# Patient Record
Sex: MALE | Race: WHITE
[De-identification: names, ages, dates, MRNs, and addresses within clinical notes are randomized per-mention and may not be internally consistent; named-entity substitution may affect disease eponyms.]

---

## 2018-03-20 ENCOUNTER — HOSPITAL ENCOUNTER (OUTPATIENT)
Dept: HOSPITAL 74 - FER | Age: 83
Setting detail: OBSERVATION
LOS: 2 days | Discharge: HOME | End: 2018-03-22
Attending: NURSE PRACTITIONER | Admitting: INTERNAL MEDICINE
Payer: COMMERCIAL

## 2018-03-20 VITALS — BODY MASS INDEX: 20.9 KG/M2

## 2018-03-20 DIAGNOSIS — J18.1: Primary | ICD-10-CM

## 2018-03-20 DIAGNOSIS — E78.5: ICD-10-CM

## 2018-03-20 DIAGNOSIS — Z79.82: ICD-10-CM

## 2018-03-20 DIAGNOSIS — I10: ICD-10-CM

## 2018-03-20 LAB
ALBUMIN SERPL-MCNC: 3.6 G/DL (ref 3.5–5)
ALP SERPL-CCNC: 72 U/L (ref 32–92)
ALT SERPL-CCNC: 20 U/L (ref 10–40)
ANION GAP SERPL CALC-SCNC: 4 MMOL/L (ref 8–16)
AST SERPL-CCNC: 31 U/L (ref 10–42)
BASOPHILS # BLD: 2.6 % (ref 0–2)
BILIRUB SERPL-MCNC: 0.6 MG/DL (ref 0.2–1)
BUN SERPL-MCNC: 25 MG/DL (ref 7–18)
CALCIUM SERPL-MCNC: 8.5 MG/DL (ref 8.4–10.2)
CHLORIDE SERPL-SCNC: 106 MMOL/L (ref 98–107)
CO2 SERPL-SCNC: 23 MMOL/L (ref 22–28)
CREAT SERPL-MCNC: 0.9 MG/DL (ref 0.6–1.3)
DEPRECATED RDW RBC AUTO: 14.2 % (ref 11.9–15.9)
EOSINOPHIL # BLD: 1.9 % (ref 0–4.5)
GLUCOSE SERPL-MCNC: 113 MG/DL (ref 74–106)
HCT VFR BLD CALC: 37.7 % (ref 35.4–49)
HGB BLD-MCNC: 12.8 GM/DL (ref 11.7–16.9)
LYMPHOCYTES # BLD: 5.1 % (ref 8–40)
MCH RBC QN AUTO: 32.4 PG (ref 25.7–33.7)
MCHC RBC AUTO-ENTMCNC: 34 G/DL (ref 32–35.9)
MCV RBC: 95.3 FL (ref 80–96)
MONOCYTES # BLD AUTO: 5.7 % (ref 3.8–10.2)
NEUTROPHILS # BLD: 84.7 % (ref 42.8–82.8)
PLATELET # BLD AUTO: 160 K/MM3 (ref 134–434)
PMV BLD: 8.8 FL (ref 7.5–11.1)
POTASSIUM SERPLBLD-SCNC: 4.2 MMOL/L (ref 3.5–5.1)
PROT SERPL-MCNC: 6.2 G/DL (ref 6.4–8.3)
RBC # BLD AUTO: 3.95 M/MM3 (ref 4–5.6)
SODIUM SERPL-SCNC: 133 MMOL/L (ref 136–145)
WBC # BLD AUTO: 11.2 K/MM3 (ref 4–10.8)

## 2018-03-20 PROCEDURE — G0378 HOSPITAL OBSERVATION PER HR: HCPCS

## 2018-03-20 NOTE — PDOC
History of Present Illness





- General


Stated Complaint: CHILLS/COUGH/COLD


Time Seen by Provider: 03/20/18 23:25


History Source: Patient


Exam Limitations: No Limitations





- History of Present Illness


Initial Comments: 





03/20/18 23:36


This is an 87-year-old male brought in by EMS for evaluation of cough 5 days 

that is now productive and associated with some chills. Patient said that his 

cough has been productive primarily white phlegm that over the last 24 hours as 

turned yellow and green and he is developed some chills. Patient denies any 

chest pain, shortness of breath, nausea, vomiting, diarrhea. Patient has a 

history otherwise of hypertension high cholesterol but denies any history of 

diabetes. 





PAST MEDICAL HISTORY:  no significant history





PAST SURGICAL HISTORY:  no significant history





FAMILY HISTORY:  no pertinant history





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





MEDICATIONS:  reviewed





ALLERGIES:  As per nursing notes





Review of Systems


General:  No fevers or chills, no weakness, no weight loss 


HEENT: No change in vision.  No sore throat,. No ear pain


CardioVascular:  No chest pain or shortness of breath


Respiratory: Positive cough productive of yellow and green phlegm


Gastrointestinal:  no nausea, vomitting, diarrhea or constipation,  No rectal 

bleeding


Genitourinary:  No dysuria, hematuria, or frequency


Musculoskeletal:  No joint or muscle pain or swelling


Neurologic: No headache, vertigo, dizziness or loss of consciousness


Psychiatric: nor depression 


Skin: No rashes or easy bruising


Endocrine: no increased thirst or abnormal weight change


Allergic: no skin or latex allergy


All other systems reviewed and normal








Exam:





General: Well-nourished well-developed individual, no acute distress


HEENT: Throat: Normal, tonsils normal, no erythema or exudate


               Neck: Supple, no meningeal signs, no lymphadenopathy


Eyes::Pupils equal reactive and round, extraocular motion intact


Chest: Nontender to palpation 


Cardiac: S1-S2 normal, regular rate and rhythm, no murmurs rubs or gallops


Respiratory: Lungs clear to auscultation bilateral


Abdomen: Soft, nondistended, normal bowel sounds, nontender to palpation 

diffusely


Extremities: Warm, dry, no cyanosis, clubbing, or edema


Skin: No rashes


Neuro: Alert and oriented x3, CN II - XII intact, nonfocal exam with normal 

strength, normal sensation, normal reflexes, normal gait, 


Psych: Normal mood and affect





Medical decision making this is an 87-year-old male who lives in an assisted 

living and comes in complaining of productive cough since several days that is 

now yellow and green with some chills and fevers. Patient did not have a fever 

here in the emergency room.





Will obtain workup to rule out infectious causes, CBC, comp, blood cultures, 

cardiac, EKG, chest x-ray





EKG shows normal sinus rhythm at a rate of 71 with left atrial enlargement 

otherwise no acute ST-T wave changes and normal intervals.





Differential includes bronchitis, pneumonia, viral upper respiratory tract 

infection,





03/21/18 00:16


 Chest x-ray shows right lower lobe infiltrate





EKG shows normal sinus rhythm at a rate of 71, some left atrial enlargement and 

an occasional PAC otherwise no acute ST-T wave changes





Labs show an elevated white count of 11.7 with a small left shift of 85% 

neutrophils. Chemistries are unremarkable other than a very mildly elevated 

glucose





Assessment and plan: This is an 87-year-old male who comes in complaining of 

cough 5 days and now with fever and worsening symptoms.





Patient will be given IV ceftriaxone and azithromycin.





Discussed admission with patient patient agrees to be admitted. Will place 

patient in observation overnight





Discussed admission and patient accepted by Dr. Vazquez for observation 











Past History





- Past Medical History


Allergies/Adverse Reactions: 


 Allergies











Allergy/AdvReac Type Severity Reaction Status Date / Time


 


No Known Allergies Allergy   Verified 03/20/18 23:36











Home Medications: 


Ambulatory Orders





Atorvastatin Ca [Lipitor] 10 mg PO HS 03/20/18 


Gabapentin [Neurontin] 300 mg PO HS 03/20/18 


Lisinopril 7.5 mg PO DAILY 03/20/18 











ED Treatment Course





- LABORATORY


CBC & Chemistry Diagram: 


 03/20/18 23:25





 03/20/18 23:25





*DC/Admit/Observation/Transfer


Diagnosis at time of Disposition: 


Pneumonia


Qualifiers:


 Pneumonia type: due to unspecified organism Laterality: right Lung location: 

lower lobe of lung Qualified Code(s): J18.1 - Lobar pneumonia, unspecified 

organism








- Discharge Dispostion


Admit: Yes





- Referrals





- Patient Instructions





- Post Discharge Activity

## 2018-03-21 LAB
ANION GAP SERPL CALC-SCNC: 12 MMOL/L (ref 8–16)
BUN SERPL-MCNC: 25 MG/DL (ref 7–18)
CALCIUM SERPL-MCNC: 7.7 MG/DL (ref 8.5–10.1)
CHLORIDE SERPL-SCNC: 110 MMOL/L (ref 98–107)
CO2 SERPL-SCNC: 22 MMOL/L (ref 21–32)
CREAT SERPL-MCNC: 0.9 MG/DL (ref 0.7–1.3)
DEPRECATED RDW RBC AUTO: 14.8 % (ref 11.9–15.9)
GLUCOSE SERPL-MCNC: 97 MG/DL (ref 74–106)
HCT VFR BLD CALC: 32.3 % (ref 35.4–49)
HGB BLD-MCNC: 10.8 GM/DL (ref 11.7–16.9)
MCH RBC QN AUTO: 32.5 PG (ref 25.7–33.7)
MCHC RBC AUTO-ENTMCNC: 33.5 G/DL (ref 32–35.9)
MCV RBC: 97.1 FL (ref 80–96)
PLATELET # BLD AUTO: 135 K/MM3 (ref 134–434)
PMV BLD: 9.9 FL (ref 7.5–11.1)
POTASSIUM SERPLBLD-SCNC: 4.3 MMOL/L (ref 3.5–5.1)
RBC # BLD AUTO: 3.33 M/MM3 (ref 4–5.6)
SODIUM SERPL-SCNC: 144 MMOL/L (ref 136–145)
WBC # BLD AUTO: 11.9 K/MM3 (ref 4–10)

## 2018-03-21 PROCEDURE — 3E0337Z INTRODUCTION OF ELECTROLYTIC AND WATER BALANCE SUBSTANCE INTO PERIPHERAL VEIN, PERCUTANEOUS APPROACH: ICD-10-PCS | Performed by: NURSE PRACTITIONER

## 2018-03-21 PROCEDURE — 3E03329 INTRODUCTION OF OTHER ANTI-INFECTIVE INTO PERIPHERAL VEIN, PERCUTANEOUS APPROACH: ICD-10-PCS | Performed by: NURSE PRACTITIONER

## 2018-03-21 PROCEDURE — 3E033GC INTRODUCTION OF OTHER THERAPEUTIC SUBSTANCE INTO PERIPHERAL VEIN, PERCUTANEOUS APPROACH: ICD-10-PCS | Performed by: NURSE PRACTITIONER

## 2018-03-21 PROCEDURE — 3E0F7GC INTRODUCTION OF OTHER THERAPEUTIC SUBSTANCE INTO RESPIRATORY TRACT, VIA NATURAL OR ARTIFICIAL OPENING: ICD-10-PCS | Performed by: NURSE PRACTITIONER

## 2018-03-21 RX ADMIN — ATORVASTATIN CALCIUM SCH MG: 10 TABLET, FILM COATED ORAL at 00:40

## 2018-03-21 RX ADMIN — FAMOTIDINE SCH MG: 20 TABLET ORAL at 10:57

## 2018-03-21 RX ADMIN — GABAPENTIN SCH MG: 300 CAPSULE ORAL at 21:19

## 2018-03-21 RX ADMIN — AZITHROMYCIN DIHYDRATE SCH MLS/HR: 500 INJECTION, POWDER, LYOPHILIZED, FOR SOLUTION INTRAVENOUS at 11:32

## 2018-03-21 RX ADMIN — CEFTRIAXONE SCH MLS/HR: 1 INJECTION, SOLUTION INTRAVENOUS at 10:52

## 2018-03-21 RX ADMIN — LISINOPRIL SCH MG: 5 TABLET ORAL at 10:57

## 2018-03-21 RX ADMIN — Medication SCH: at 10:57

## 2018-03-21 RX ADMIN — ATORVASTATIN CALCIUM SCH MG: 10 TABLET, FILM COATED ORAL at 21:19

## 2018-03-21 RX ADMIN — FAMOTIDINE SCH MG: 20 TABLET ORAL at 21:19

## 2018-03-21 RX ADMIN — GABAPENTIN SCH MG: 300 CAPSULE ORAL at 00:40

## 2018-03-21 NOTE — HP
CHIEF COMPLAINT:  cough





PCP:  camron olea 





HISTORY OF PRESENT ILLNESS:  Patient is a 88 y/o male with a past medical 

history of aortic stenosis, hypertension, diaphramatic rupture (s/p ski accident

), and hyperlipidemia. Patient reports with 5 days of ongoing moist cough.  

Patient reports the cough within the past 24 hours with chills, as result he 

sought evaluation in the emergency department.  





ER course was notable for:


(1)chest xray elevated left hemidiaphram, no infilitrates no effusions noted 


(2)wbc 11.9


(3) temp 101.8 upon arrival to the emergency department 





Recent Travel: none


]


PAST MEDICAL HISTORY:  see hpi 





PAST SURGICAL HISTORY:  left shoulder brachial plexus injury 





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 


Smoking:none


Alcohol:none


Drugs: none  





Family History:  see hpi 


Allergies





No Known Allergies Allergy (Verified 03/20/18 23:36)


 








HOME MEDICATIONS:


 Home Medications











 Medication  Instructions  Recorded


 


Atorvastatin Ca [Lipitor] 10 mg PO HS 03/20/18


 


Gabapentin [Neurontin] 300 mg PO HS 03/20/18


 


Lisinopril 7.5 mg PO DAILY 03/20/18








REVIEW OF SYSTEMS


CONSTITUTIONAL: 


present: fever, chills,


Absent: diaphoresis, generalized weakness, malaise, loss of appetite, weight 

change


HEENT: 


Absent:  rhinorrhea, nasal congestion, throat pain, throat swelling, difficulty 

swallowing, mouth swelling, ear pain, eye pain, visual changes


CARDIOVASCULAR: 


Absent: chest pain, syncope, palpitations, irregular heart rate, lightheadedness

, peripheral edema


RESPIRATORY: 


present: cough,


Absent:  shortness of breath, dyspnea with exertion, orthopnea, wheezing, 

stridor, hemoptysis


GASTROINTESTINAL:


Absent: abdominal pain, abdominal distension, nausea, vomiting, diarrhea, 

constipation, melena, hematochezia


GENITOURINARY: 


Absent: dysuria, frequency, urgency, hesitancy, hematuria, flank pain, genital 

pain


MUSCULOSKELETAL: 


Absent: myalgia, arthralgia, joint swelling, back pain, neck pain


SKIN: 


Absent: rash, itching, pallor


HEMATOLOGIC/IMMUNOLOGIC: 


Absent: easy bleeding, easy bruising, lymphadenopathy, frequent infections


ENDOCRINE:


Absent: unexplained weight gain, unexplained weight loss, heat intolerance, 

cold intolerance


NEUROLOGIC: 


Absent: headache, focal weakness or paresthesias, dizziness, unsteady gait, 

seizure, mental status changes, bladder or bowel incontinence


PSYCHIATRIC: 


Absent: anxiety, depression, suicidal or homicidal ideation, hallucinations.








PHYSICAL EXAMINATION


 Vital Signs - 24 hr











  03/20/18 03/21/18 03/21/18





  23:39 01:51 06:07


 


Temperature 101.8 F H 97.7 F 98 F


 


Pulse Rate 79  


 


Pulse Rate [  65 62





Right]   


 


Respiratory 18  18





Rate   


 


Blood Pressure 146/64  


 


Blood Pressure  114/48 114/60





[Right Arm]   


 


O2 Sat by Pulse 96 95 96





Oximetry (%)   











GENERAL: Awake, alert, and fully oriented, in no acute distress.


HEAD: Normal with no signs of trauma.


EYES: Pupils equal, round and reactive to light, extraocular movements intact, 

sclera anicteric, conjunctiva clear. No lid lag.


EARS, NOSE, THROAT: Ears normal, nares patent, oropharynx clear without 

exudates. Moist mucous membranes.


NECK: Normal range of motion, supple without lymphadenopathy, JVD, or masses.


LUNGS: Breath sounds equal, clear to apexes bilaterally, diminished to the left 

base, No wheezes, and no crackles. No accessory muscle use.


HEART: Regular rate and rhythm, normal S1 and S2, 36 systolic murmur, no rub or 

gallop.


ABDOMEN: Soft, nontender, not distended, normoactive bowel sounds, no guarding, 

no rebound, no masses.  No hepatomegaly or  splenomegaly. 


MUSCULOSKELETAL: Normal range of motion at all joints. No bony deformities or 

tenderness. No CVA tenderness.


UPPER EXTREMITIES: 2+ pulses, warm, well-perfused. No cyanosis. No clubbing. No 

peripheral edema.


LOWER EXTREMITIES: 2+ pulses, warm, well-perfused. No calf tenderness. No 

peripheral edema. 


NEUROLOGICAL:  Cranial nerves II-XII intact. Normal speech. Normal gait.


PSYCHIATRIC: Cooperative. Good eye contact. Appropriate mood and affect.


SKIN: Warm, dry, normal turgor, no rashes or lesions noted, normal capillary 

refill. 


 Laboratory Results - last 24 hr











  03/20/18 03/20/18 03/20/18





  23:25 23:25 23:25


 


WBC  11.2 H  


 


RBC  3.95 L  


 


Hgb  12.8  


 


Hct  37.7  


 


MCV  95.3  


 


MCH  32.4  


 


MCHC  34.0  


 


RDW  14.2  


 


Plt Count  160  


 


MPV  8.8  


 


Neutrophils %  84.7 H  


 


Lymphocytes %  5.1 L  


 


Monocytes %  5.7  


 


Eosinophils %  1.9  


 


Basophils %  2.6 H  


 


Sodium   133 L 


 


Potassium   4.2 


 


Chloride   106 


 


Carbon Dioxide   23 


 


Anion Gap   4 L 


 


BUN   25 H 


 


Creatinine   0.9 


 


Creat Clearance w eGFR   > 60 


 


Random Glucose   113 H 


 


Lactic Acid   


 


Calcium   8.5 


 


Total Bilirubin   0.6 


 


AST   31 


 


ALT   20 


 


Alkaline Phosphatase   72 


 


Creatine Kinase   


 


Troponin I    < 0.03


 


Total Protein   6.2 L 


 


Albumin   3.6 


 


Blood Type   


 


Antibody Screen   














  03/20/18 03/21/18 03/21/18





  23:25 01:43 01:45


 


WBC   


 


RBC   


 


Hgb   


 


Hct   


 


MCV   


 


MCH   


 


MCHC   


 


RDW   


 


Plt Count   


 


MPV   


 


Neutrophils %   


 


Lymphocytes %   


 


Monocytes %   


 


Eosinophils %   


 


Basophils %   


 


Sodium   


 


Potassium   


 


Chloride   


 


Carbon Dioxide   


 


Anion Gap   


 


BUN   


 


Creatinine   


 


Creat Clearance w eGFR   


 


Random Glucose   


 


Lactic Acid   0.8 


 


Calcium   


 


Total Bilirubin   


 


AST   


 


ALT   


 


Alkaline Phosphatase   


 


Creatine Kinase  128  


 


Troponin I   


 


Total Protein   


 


Albumin   


 


Blood Type    A POSITIVE


 


Antibody Screen    Negative














  03/21/18 03/21/18





  01:45 06:45


 


WBC   11.9 H


 


RBC   3.33 L


 


Hgb   10.8 L


 


Hct   32.3 L


 


MCV   97.1 H


 


MCH   32.5


 


MCHC   33.5


 


RDW   14.8


 


Plt Count   135


 


MPV   9.9


 


Neutrophils %  


 


Lymphocytes %  


 


Monocytes %  


 


Eosinophils %  


 


Basophils %  


 


Sodium  


 


Potassium  


 


Chloride  


 


Carbon Dioxide  


 


Anion Gap  


 


BUN  


 


Creatinine  


 


Creat Clearance w eGFR  


 


Random Glucose  


 


Lactic Acid  


 


Calcium  


 


Total Bilirubin  


 


AST  


 


ALT  


 


Alkaline Phosphatase  


 


Creatine Kinase  


 


Troponin I  


 


Total Protein  


 


Albumin  


 


Blood Type  A POSITIVE 


 


Antibody Screen  











ASSESSMENT/PLAN:








1) pulm


community acquired pneumonia


- continue empiric zithromax and rocephin, pending chest ct


- slight leukocytoisis noted, patient was febrile upon arrival, pending urine 

antigens


- keep spo2 above 92% with supplemental O2





2) cardiovascular


hypertension 


- continue lisinopril, b/p remains at goal


hyperlipidemia


- continue statin





f/e/n


- regular diet


- replete lytes prn





ppx


- pepcid


- oob


- scd





dispo: pt requires observation admission 





full code 








Visit type





- Emergency Visit


Emergency Visit: Yes


ED Registration Date: 03/21/18


Care time: The patient presented to the Emergency Department on the above date 

and was hospitalized for further evaluation of their emergent condition.





- New Patient


This patient is new to me today: Yes


Date on this admission: 03/22/18





- Critical Care


Critical Care patient: No





Hospitalist Screening





- Colonoscopy Questionnaire


Colonoscopy Questionnaire: 





Colonoscopy Questionnaire








-   Patient:


50 - 75 years old and never had a screening colonoscopy: Yes


History of colon or rectal polyps, or CA: No


History of IBD, Crohn's disease or UC: No


History of abdominal radiation therapy as a child: No





-   Relative:


1 with colon or rectal CA, or polyps at age 60 or younger: No


Colon or rectal CA diagnosed at age 45 or younger: No


Multiple relatives with colon or rectal CA: No





-   Outcome:


Screening Result: Positive Screen

## 2018-03-21 NOTE — EKG
Test Reason : 

Blood Pressure : ***/*** mmHG

Vent. Rate : 071 BPM     Atrial Rate : 071 BPM

   P-R Int : 148 ms          QRS Dur : 088 ms

    QT Int : 380 ms       P-R-T Axes : 065 001 048 degrees

   QTc Int : 412 ms

 

SINUS RHYTHM WITH PREMATURE ATRIAL COMPLEXES

POSSIBLE LEFT ATRIAL ENLARGEMENT

BORDERLINE ECG

NO PREVIOUS ECGS AVAILABLE

Confirmed by GUILLERMO QUIONNES, LENNY ([id number]) on 3/21/2018 9:57:27 AM

 

Referred By: EUSEBIO DAWSON           Confirmed By:LENNY LI MD

## 2018-03-22 VITALS — TEMPERATURE: 98.7 F | SYSTOLIC BLOOD PRESSURE: 129 MMHG | HEART RATE: 63 BPM | DIASTOLIC BLOOD PRESSURE: 51 MMHG

## 2018-03-22 LAB
ALBUMIN SERPL-MCNC: 3.3 G/DL (ref 3.5–5)
ALP SERPL-CCNC: 78 U/L (ref 32–92)
ALT SERPL-CCNC: 26 U/L (ref 10–40)
ANION GAP SERPL CALC-SCNC: 6 MMOL/L (ref 8–16)
AST SERPL-CCNC: 33 U/L (ref 10–42)
BASOPHILS # BLD: 0.3 % (ref 0–2)
BILIRUB SERPL-MCNC: 0.6 MG/DL (ref 0.2–1)
BUN SERPL-MCNC: 19 MG/DL (ref 7–18)
CALCIUM SERPL-MCNC: 8.5 MG/DL (ref 8.4–10.2)
CHLORIDE SERPL-SCNC: 110 MMOL/L (ref 98–107)
CO2 SERPL-SCNC: 23 MMOL/L (ref 22–28)
CREAT SERPL-MCNC: 1 MG/DL (ref 0.6–1.3)
DEPRECATED RDW RBC AUTO: 14.6 % (ref 11.9–15.9)
EOSINOPHIL # BLD: 1.5 % (ref 0–4.5)
GLUCOSE SERPL-MCNC: 126 MG/DL (ref 74–106)
HCT VFR BLD CALC: 38 % (ref 35.4–49)
HGB BLD-MCNC: 12.5 GM/DL (ref 11.7–16.9)
LYMPHOCYTES # BLD: 9.2 % (ref 8–40)
MAGNESIUM SERPL-MCNC: 2 MG/DL (ref 1.8–2.4)
MCH RBC QN AUTO: 32.1 PG (ref 25.7–33.7)
MCHC RBC AUTO-ENTMCNC: 32.8 G/DL (ref 32–35.9)
MCV RBC: 97.8 FL (ref 80–96)
MONOCYTES # BLD AUTO: 6.1 % (ref 3.8–10.2)
NEUTROPHILS # BLD: 82.9 % (ref 42.8–82.8)
PHOSPHATE SERPL-MCNC: 1.9 MG/DL (ref 2.5–4.6)
PLATELET # BLD AUTO: 152 K/MM3 (ref 134–434)
PMV BLD: 9.3 FL (ref 7.5–11.1)
POTASSIUM SERPLBLD-SCNC: 3.8 MMOL/L (ref 3.5–5.1)
PROT SERPL-MCNC: 5.8 G/DL (ref 6.4–8.3)
RBC # BLD AUTO: 3.89 M/MM3 (ref 4–5.6)
SODIUM SERPL-SCNC: 139 MMOL/L (ref 136–145)
WBC # BLD AUTO: 12.3 K/MM3 (ref 4–10.8)

## 2018-03-22 RX ADMIN — AZITHROMYCIN DIHYDRATE SCH MLS/HR: 500 INJECTION, POWDER, LYOPHILIZED, FOR SOLUTION INTRAVENOUS at 11:38

## 2018-03-22 RX ADMIN — LISINOPRIL SCH MG: 5 TABLET ORAL at 10:28

## 2018-03-22 RX ADMIN — Medication SCH TAB: at 10:20

## 2018-03-22 RX ADMIN — CEFTRIAXONE SCH MLS/HR: 1 INJECTION, SOLUTION INTRAVENOUS at 10:26

## 2018-03-22 RX ADMIN — FAMOTIDINE SCH MG: 20 TABLET ORAL at 10:28

## 2018-03-22 NOTE — DS
Physical Exam: 


SUBJECTIVE: Patient seen and examined, patient is ambulatory throughout nursing 

station, denies any shortness of breath or chest pain





OBJECTIVE:  Patient is a 88 y/o male with a past medical history of aortic 

stenosis, hypertension, diaphramatic rupture (s/p ski accident), and 

hyperlipidemia. Patient reports with 5 days of ongoing moist cough.  Patient 

reports the cough within the past 24 hours with chills, as result he sought 

evaluation in the emergency department.  





ER course was notable for:


(1)chest xray elevated left hemidiaphram, no infilitrates no effusions noted 


(2)wbc 11.9


(3) temp 101.8 upon arrival to the emergency department 








 Vital Signs











 Period  Temp  Pulse  Resp  BP Sys/Stewart  Pulse Ox


 


 Last 24 Hr  98.6 F-99.6 F  63-88  17-18  129-134/51-58  94-96








PHYSICAL EXAM











GENERAL: Awake, alert, and fully oriented, in no acute distress.


HEAD: Normal with no signs of trauma.


EYES: Pupils equal, round and reactive to light, extraocular movements intact, 

sclera anicteric, conjunctiva clear. No lid lag.


EARS, NOSE, THROAT: Ears normal, nares patent, oropharynx clear without 

exudates. Moist mucous membranes.


NECK: Normal range of motion, supple without lymphadenopathy, JVD, or masses.


LUNGS: Breath sounds equal, clear to apexes bilaterally, diminished to the left 

base, No wheezes, and no crackles. No accessory muscle use.


HEART: Regular rate and rhythm, normal S1 and S2, 36 systolic murmur, no rub or 

gallop.


ABDOMEN: Soft, nontender, not distended, normoactive bowel sounds, no guarding, 

no rebound, no masses.  No hepatomegaly or  splenomegaly. 


MUSCULOSKELETAL: Normal range of motion at all joints. No bony deformities or 

tenderness. No CVA tenderness.


UPPER EXTREMITIES: 2+ pulses, warm, well-perfused. No cyanosis. No clubbing. No 

peripheral edema.


LOWER EXTREMITIES: 2+ pulses, warm, well-perfused. No calf tenderness. No 

peripheral edema. 


NEUROLOGICAL:  Cranial nerves II-XII intact. Normal speech. Normal gait.


PSYCHIATRIC: Cooperative. Good eye contact. Appropriate mood and affect.


SKIN: Warm, dry, normal turgor, no rashes or lesions noted, normal capillary 

refill. 








LABS


 Laboratory Results - last 24 hr











  03/22/18 03/22/18





  09:45 09:45


 


WBC  12.3 H 


 


RBC  3.89 L 


 


Hgb  12.5 


 


Hct  38.0 


 


MCV  97.8 H 


 


MCH  32.1 


 


MCHC  32.8 


 


RDW  14.6 


 


Plt Count  152 


 


MPV  9.3 


 


Neutrophils %  82.9 H 


 


Lymphocytes %  9.2  D 


 


Monocytes %  6.1 


 


Eosinophils %  1.5 


 


Basophils %  0.3 


 


Sodium   139


 


Potassium   3.8


 


Chloride   110 H


 


Carbon Dioxide   23


 


Anion Gap   6 L


 


BUN   19 H D


 


Creatinine   1.0


 


Creat Clearance w eGFR   > 60


 


Random Glucose   126 H


 


Calcium   8.5


 


Phosphorus   1.9 L


 


Magnesium   2.0


 


Total Bilirubin   0.6


 


AST   33


 


ALT   26  D


 


Alkaline Phosphatase   78


 


Total Protein   5.8 L


 


Albumin   3.3 L





 Microbiology





03/20/18 23:25   Blood - Peripheral Venous   Blood Culture - Preliminary


                            NO GROWTH OBTAINED AFTER 24 HOURS, INCUBATION TO 

CONTINUE


                            FOR 4 DAYS.


03/20/18 23:25   Blood - Peripheral Venous   Blood Culture - Preliminary


                            NO GROWTH OBTAINED AFTER 24 HOURS, INCUBATION TO 

CONTINUE


                            FOR 4 DAYS.





IMAGING


CT of chest w/o contrast: moderate elevation of the left diaphragm, mild left 

basilar opacity, forcal opacity within the right middle lobe, mild cardiomegaly

, 0.4cm right lower love pulmonary nodule 





HOSPITAL COURSE:





   Patient was admitted from the emergency department to observation for 

community acquired pneumonia, patient was treated with 2 days of zithromax and 

rocephin.  slight leukocytosis noted, patient is afebrile, blood cultures are 

negative to date. CT of chest reviewed as above.  Patient has a past medical 

history of hypertension, lisinopril continued, blood pressure remained at goal. 





PLAN


- discharge with strict follow up to pcp within 1 week


- continue ceftin for the next 7 days


- albuterol nebulizer every 4 hours as needed for shortness of breath


- return precautions reviewed. 





Date of Admission:03/21/18





Date of Discharge: 03/22/18











Minutes to complete discharge: 45





Discharge Summary


Reason For Visit: PNEUMONIA


Current Active Problems





Pneumonia (Acute)











- Instructions





- Home Medications


Comprehensive Discharge Medication List: 


Ambulatory Orders





Atorvastatin Ca [Lipitor] 10 mg PO HS 03/20/18 


Gabapentin [Neurontin] 300 mg PO HS 03/20/18 


Lisinopril 5 mg PO DAILY 03/20/18 


Aspirin [ASA -] 81 mg PO DAILY 03/21/18 


Lisinopril [Prinivil] 2.5 mg PO HS 03/21/18